# Patient Record
Sex: MALE | Race: WHITE | URBAN - METROPOLITAN AREA
[De-identification: names, ages, dates, MRNs, and addresses within clinical notes are randomized per-mention and may not be internally consistent; named-entity substitution may affect disease eponyms.]

---

## 2021-08-11 ENCOUNTER — WEB ENCOUNTER (OUTPATIENT)
Dept: URBAN - METROPOLITAN AREA CLINIC 74 | Facility: CLINIC | Age: 46
End: 2021-08-11

## 2021-08-11 ENCOUNTER — OFFICE VISIT (OUTPATIENT)
Dept: URBAN - METROPOLITAN AREA CLINIC 74 | Facility: CLINIC | Age: 46
End: 2021-08-11
Payer: COMMERCIAL

## 2021-08-11 ENCOUNTER — LAB OUTSIDE AN ENCOUNTER (OUTPATIENT)
Dept: URBAN - METROPOLITAN AREA CLINIC 74 | Facility: CLINIC | Age: 46
End: 2021-08-11

## 2021-08-11 DIAGNOSIS — K83.01 PSC (PRIMARY SCLEROSING CHOLANGITIS): ICD-10-CM

## 2021-08-11 DIAGNOSIS — L80 VITILIGO: ICD-10-CM

## 2021-08-11 DIAGNOSIS — R53.82 CHRONIC FATIGUE: ICD-10-CM

## 2021-08-11 DIAGNOSIS — Z90.49 HX OF TOTAL COLECTOMY: ICD-10-CM

## 2021-08-11 DIAGNOSIS — K51.018 ULCERATIVE PANCOLITIS WITH OTHER COMPLICATION: ICD-10-CM

## 2021-08-11 PROCEDURE — 99213 OFFICE O/P EST LOW 20 MIN: CPT | Performed by: INTERNAL MEDICINE

## 2021-08-11 NOTE — HPI-TODAY'S VISIT:
Patient presents for follow-up.  Last seen 2019.  46-year-old male with a history of ulcerative colitis remotely.  Severely active years ago, failed biologic therapy, seen at [facility] and eventually he had a total colectomy with J-pouch.  Flex sig 2018 by myself showed mild pouchitis but biopsies were negative.  He has been treated with Canasa suppository on and off over the years.  He also has a history of PSC.  MRI/MRCP screening here revealed beaded common bile duct confirmatory for PSC, CMP has been negative.  Followed at [facility] for this.  He is overdue for surveillance at this time, mainly delayed due to Covid lockdown. Point bilirubin 0.5 AST 27 ALT 30 creatinine 1.25 hemoglobin 16 platelet 226  He had Covid last year, he still carrying antibodies 12 months later as reviewed.  Otherwise doing well in that regard.  No rectal bleeding, none.

## 2021-09-03 ENCOUNTER — TELEPHONE ENCOUNTER (OUTPATIENT)
Dept: URBAN - METROPOLITAN AREA CLINIC 74 | Facility: CLINIC | Age: 46
End: 2021-09-03

## 2021-09-03 RX ORDER — COLESTIPOL HYDROCHLORIDE 1 G/1
2 TABLETS TABLET, FILM COATED ORAL BID
Qty: 120 TABLET | Refills: 1 | OUTPATIENT
Start: 2021-09-03

## 2021-10-05 ENCOUNTER — OFFICE VISIT (OUTPATIENT)
Dept: URBAN - METROPOLITAN AREA SURGERY CENTER 30 | Facility: SURGERY CENTER | Age: 46
End: 2021-10-05
Payer: COMMERCIAL

## 2021-10-05 ENCOUNTER — TELEPHONE ENCOUNTER (OUTPATIENT)
Dept: URBAN - METROPOLITAN AREA CLINIC 92 | Facility: CLINIC | Age: 46
End: 2021-10-05

## 2021-10-05 DIAGNOSIS — K63.89 BACTERIAL OVERGROWTH SYNDROME: ICD-10-CM

## 2021-10-05 DIAGNOSIS — K51.00 ACUTE ULCERATIVE PANCOLITIS: ICD-10-CM

## 2021-10-05 PROCEDURE — G8907 PT DOC NO EVENTS ON DISCHARG: HCPCS | Performed by: INTERNAL MEDICINE

## 2021-10-05 PROCEDURE — 44386 ENDOSCOPY BOWEL POUCH/BIOP: CPT | Performed by: INTERNAL MEDICINE

## 2021-10-05 RX ORDER — COLESTIPOL HYDROCHLORIDE 1 G/1
2 TABLETS TABLET, FILM COATED ORAL BID
Qty: 120 TABLET | Refills: 1 | Status: ACTIVE | COMMUNITY
Start: 2021-09-03

## 2021-10-05 RX ORDER — MESALAMINE 1000 MG/1
1 SUPPOSITORY AT BEDTIME SUPPOSITORY RECTAL ONCE A DAY
Qty: 30 | OUTPATIENT
Start: 2021-10-05 | End: 2021-11-04

## 2021-10-20 ENCOUNTER — OFFICE VISIT (OUTPATIENT)
Dept: URBAN - METROPOLITAN AREA CLINIC 74 | Facility: CLINIC | Age: 46
End: 2021-10-20
Payer: COMMERCIAL

## 2021-10-20 ENCOUNTER — WEB ENCOUNTER (OUTPATIENT)
Dept: URBAN - METROPOLITAN AREA CLINIC 74 | Facility: CLINIC | Age: 46
End: 2021-10-20

## 2021-10-20 VITALS
HEIGHT: 69 IN | DIASTOLIC BLOOD PRESSURE: 72 MMHG | BODY MASS INDEX: 26.07 KG/M2 | HEART RATE: 79 BPM | SYSTOLIC BLOOD PRESSURE: 122 MMHG | WEIGHT: 176 LBS | TEMPERATURE: 97.7 F

## 2021-10-20 DIAGNOSIS — L80 VITILIGO: ICD-10-CM

## 2021-10-20 DIAGNOSIS — K51.018 ULCERATIVE PANCOLITIS WITH OTHER COMPLICATION: ICD-10-CM

## 2021-10-20 DIAGNOSIS — Z90.49 HX OF TOTAL COLECTOMY: ICD-10-CM

## 2021-10-20 DIAGNOSIS — K62.89 OTHER SPECIFIED DISEASES OF ANUS AND RECTUM: ICD-10-CM

## 2021-10-20 DIAGNOSIS — K83.01 PSC (PRIMARY SCLEROSING CHOLANGITIS): ICD-10-CM

## 2021-10-20 DIAGNOSIS — R53.82 CHRONIC FATIGUE: ICD-10-CM

## 2021-10-20 PROCEDURE — 99213 OFFICE O/P EST LOW 20 MIN: CPT | Performed by: INTERNAL MEDICINE

## 2021-10-20 RX ORDER — MESALAMINE 1000 MG/1
1 SUPPOSITORY AT BEDTIME SUPPOSITORY RECTAL ONCE A DAY
Qty: 30 | Status: ACTIVE | COMMUNITY
Start: 2021-10-05 | End: 2021-11-04

## 2021-10-20 RX ORDER — COLESTIPOL HYDROCHLORIDE 1 G/1
2 TABLETS TABLET, FILM COATED ORAL BID
Qty: 120 TABLET | Refills: 1 | Status: ACTIVE | COMMUNITY
Start: 2021-09-03

## 2021-10-20 RX ORDER — MESALAMINE 1000 MG/1
1 SUPPOSITORY AT BEDTIME SUPPOSITORY RECTAL ONCE A DAY
Qty: 30 | OUTPATIENT

## 2021-10-20 RX ORDER — MESALAMINE 1.2 G/1
4 TABLET, DELAYED RELEASE ORAL ONCE A DAY
Qty: 120 | Refills: 5 | OUTPATIENT
Start: 2021-10-20 | End: 2022-04-18

## 2021-10-20 NOTE — HPI-TODAY'S VISIT:
Patient presents for follow-up.   Colonoscopy (flex sig) the other day confirmed cuffitis, mild to moderate. Pouch looked great and bx negative for Crohn's. Bx of cuff confirmed active and chronic iflammation.  He has tried the Canasa, but the suppository scratches the anus and causes irritation and otherwise doesn't help much with symptoms.  Prior note: Last seen 2019.  46-year-old male with a history of ulcerative colitis remotely.  Severely active years ago, failed biologic therapy, seen at [facility] and eventually he had a total colectomy with J-pouch.  Flex sig 2018 by myself showed mild pouchitis but biopsies were negative.  He has been treated with Canasa suppository on and off over the years.  He also has a history of PSC.  MRI/MRCP screening here revealed beaded common bile duct confirmatory for PSC, CMP has been negative.  Followed at [facility] for this.  He is overdue for surveillance at this time, mainly delayed due to Covid lockdown. Point bilirubin 0.5 AST 27 ALT 30 creatinine 1.25 hemoglobin 16 platelet 226  He had Covid last year, he still carrying antibodies 12 months later as reviewed.  Otherwise doing well in that regard.  No rectal bleeding, none.

## 2022-04-20 ENCOUNTER — OFFICE VISIT (OUTPATIENT)
Dept: URBAN - METROPOLITAN AREA CLINIC 74 | Facility: CLINIC | Age: 47
End: 2022-04-20
Payer: COMMERCIAL

## 2022-04-20 DIAGNOSIS — K83.01 PSC (PRIMARY SCLEROSING CHOLANGITIS): ICD-10-CM

## 2022-04-20 DIAGNOSIS — Z90.49 HX OF TOTAL COLECTOMY: ICD-10-CM

## 2022-04-20 DIAGNOSIS — R53.82 CHRONIC FATIGUE: ICD-10-CM

## 2022-04-20 DIAGNOSIS — K51.018 ULCERATIVE PANCOLITIS WITH OTHER COMPLICATION: ICD-10-CM

## 2022-04-20 DIAGNOSIS — K62.89 OTHER SPECIFIED DISEASES OF ANUS AND RECTUM: ICD-10-CM

## 2022-04-20 DIAGNOSIS — L80 VITILIGO: ICD-10-CM

## 2022-04-20 PROBLEM — 52702003 CHRONIC FATIGUE SYNDROME: Status: ACTIVE | Noted: 2021-08-11

## 2022-04-20 PROBLEM — 197441003: Status: ACTIVE | Noted: 2021-08-11

## 2022-04-20 PROBLEM — 444548001: Status: ACTIVE | Noted: 2021-08-11

## 2022-04-20 PROBLEM — 119771000119101: Status: ACTIVE | Noted: 2021-08-11

## 2022-04-20 PROCEDURE — 99213 OFFICE O/P EST LOW 20 MIN: CPT | Performed by: INTERNAL MEDICINE

## 2022-04-20 RX ORDER — COLESTIPOL HYDROCHLORIDE 1 G/1
2 TABLETS TABLET, FILM COATED ORAL BID
Qty: 120 TABLET | Refills: 1 | Status: DISCONTINUED | COMMUNITY
Start: 2021-09-03

## 2022-04-20 RX ORDER — MESALAMINE 1000 MG/1
1 SUPPOSITORY AT BEDTIME SUPPOSITORY RECTAL ONCE A DAY
Qty: 30 | OUTPATIENT

## 2022-04-20 RX ORDER — MESALAMINE 1000 MG/1
1 SUPPOSITORY AT BEDTIME SUPPOSITORY RECTAL ONCE A DAY
Qty: 30 | Status: DISCONTINUED | COMMUNITY

## 2022-04-20 NOTE — HPI-TODAY'S VISIT:
Patient presents for follow-up.   Doing well on Mesalamine 4.8 g/d, but he does notice that he does not "feel better" but then again he clarifies that he did not feel bad or have rectal bleeding at last visit when he was dx with Pouchitis. Still 8 bm daily, no overt bleeding, no tenesmus.  Colonoscopy (flex sig) last year confirmed cuffitis, mild to moderate. Pouch looked great and bx negative for Crohn's. Bx of cuff confirmed active and chronic iflammation.  He has tried the Canasa, but the suppository scratches the anus and causes irritation and otherwise doesn't help much with symptoms.

## 2022-10-19 ENCOUNTER — OFFICE VISIT (OUTPATIENT)
Dept: URBAN - METROPOLITAN AREA CLINIC 74 | Facility: CLINIC | Age: 47
End: 2022-10-19

## 2022-10-19 RX ORDER — MESALAMINE 1000 MG/1
1 SUPPOSITORY AT BEDTIME SUPPOSITORY RECTAL ONCE A DAY
Qty: 30 | Status: ACTIVE | COMMUNITY

## 2022-12-12 ENCOUNTER — OFFICE VISIT (OUTPATIENT)
Dept: URBAN - METROPOLITAN AREA CLINIC 74 | Facility: CLINIC | Age: 47
End: 2022-12-12

## 2023-10-18 ENCOUNTER — LAB OUTSIDE AN ENCOUNTER (OUTPATIENT)
Dept: URBAN - METROPOLITAN AREA CLINIC 74 | Facility: CLINIC | Age: 48
End: 2023-10-18

## 2023-10-18 ENCOUNTER — OFFICE VISIT (OUTPATIENT)
Dept: URBAN - METROPOLITAN AREA CLINIC 74 | Facility: CLINIC | Age: 48
End: 2023-10-18
Payer: COMMERCIAL

## 2023-10-18 ENCOUNTER — DASHBOARD ENCOUNTERS (OUTPATIENT)
Age: 48
End: 2023-10-18

## 2023-10-18 VITALS
TEMPERATURE: 97.3 F | BODY MASS INDEX: 26.66 KG/M2 | OXYGEN SATURATION: 98 % | DIASTOLIC BLOOD PRESSURE: 78 MMHG | SYSTOLIC BLOOD PRESSURE: 142 MMHG | HEIGHT: 69 IN | HEART RATE: 84 BPM | WEIGHT: 180 LBS

## 2023-10-18 DIAGNOSIS — Z90.49 HX OF TOTAL COLECTOMY: ICD-10-CM

## 2023-10-18 DIAGNOSIS — L80 VITILIGO: ICD-10-CM

## 2023-10-18 DIAGNOSIS — K62.89 OTHER SPECIFIED DISEASES OF ANUS AND RECTUM: ICD-10-CM

## 2023-10-18 DIAGNOSIS — K83.01 PSC (PRIMARY SCLEROSING CHOLANGITIS): ICD-10-CM

## 2023-10-18 DIAGNOSIS — K51.018 ULCERATIVE PANCOLITIS WITH OTHER COMPLICATION: ICD-10-CM

## 2023-10-18 DIAGNOSIS — R53.82 CHRONIC FATIGUE: ICD-10-CM

## 2023-10-18 PROCEDURE — 99214 OFFICE O/P EST MOD 30 MIN: CPT | Performed by: INTERNAL MEDICINE

## 2023-10-18 RX ORDER — MESALAMINE 1000 MG/1
1 SUPPOSITORY AT BEDTIME SUPPOSITORY RECTAL ONCE A DAY
Qty: 30 | Status: ACTIVE | COMMUNITY

## 2023-11-07 ENCOUNTER — OFFICE VISIT (OUTPATIENT)
Dept: URBAN - METROPOLITAN AREA SURGERY CENTER 30 | Facility: SURGERY CENTER | Age: 48
End: 2023-11-07
Payer: COMMERCIAL

## 2023-11-07 ENCOUNTER — CLAIMS CREATED FROM THE CLAIM WINDOW (OUTPATIENT)
Dept: URBAN - METROPOLITAN AREA CLINIC 4 | Facility: CLINIC | Age: 48
End: 2023-11-07
Payer: COMMERCIAL

## 2023-11-07 DIAGNOSIS — K51.00 ACUTE ULCERATIVE PANCOLITIS: ICD-10-CM

## 2023-11-07 DIAGNOSIS — K51.00 PANCOLITIS: ICD-10-CM

## 2023-11-07 DIAGNOSIS — K63.3 APHTHOUS ULCERATION OF COLON: ICD-10-CM

## 2023-11-07 DIAGNOSIS — K91.850 POUCHITIS: ICD-10-CM

## 2023-11-07 PROCEDURE — 88305 TISSUE EXAM BY PATHOLOGIST: CPT | Performed by: PATHOLOGY

## 2023-11-07 PROCEDURE — 44386 ENDOSCOPY BOWEL POUCH/BIOP: CPT | Performed by: INTERNAL MEDICINE

## 2023-11-07 PROCEDURE — G8907 PT DOC NO EVENTS ON DISCHARG: HCPCS | Performed by: INTERNAL MEDICINE

## 2023-11-07 PROCEDURE — 45331 SIGMOIDOSCOPY AND BIOPSY: CPT | Performed by: INTERNAL MEDICINE

## 2023-11-07 PROCEDURE — 00811 ANES LWR INTST NDSC NOS: CPT | Performed by: NURSE ANESTHETIST, CERTIFIED REGISTERED

## 2023-11-07 RX ORDER — MESALAMINE 1000 MG/1
1 SUPPOSITORY AT BEDTIME SUPPOSITORY RECTAL ONCE A DAY
Qty: 30 | Status: ACTIVE | COMMUNITY

## 2023-11-07 RX ORDER — MESALAMINE 1.2 G/1
2 TABLETS WITH A MEAL TABLET, DELAYED RELEASE ORAL ONCE A DAY
Qty: 60 TABLET | Refills: 5 | OUTPATIENT
Start: 2023-11-07 | End: 2024-05-05